# Patient Record
Sex: FEMALE | Race: ASIAN | NOT HISPANIC OR LATINO | Employment: UNEMPLOYED | URBAN - METROPOLITAN AREA
[De-identification: names, ages, dates, MRNs, and addresses within clinical notes are randomized per-mention and may not be internally consistent; named-entity substitution may affect disease eponyms.]

---

## 2019-04-08 ENCOUNTER — NON-PROVIDER VISIT (OUTPATIENT)
Dept: URGENT CARE | Facility: CLINIC | Age: 24
End: 2019-04-08

## 2019-04-08 DIAGNOSIS — Z11.1 PPD SCREENING TEST: ICD-10-CM

## 2019-04-08 PROCEDURE — 86580 TB INTRADERMAL TEST: CPT | Performed by: NURSE PRACTITIONER

## 2019-04-08 NOTE — NON-PROVIDER
Dorcas Anton is a 23 y.o. female here for a non-provider visit for PPD placement -- Step 1 of 1    Reason for PPD:  work requirement    1. TB evaluation questionnaire completed by patient? Yes      -  If any answers marked yes did you contact a provider prior to placing? Not Indicated  2.  Patient notified to return to clinic for reading on: 04/10/19 or 04/11/19  3.  PPD Placement documentation completed on TB evaluation questionnaire? Yes  4.  Location of TB evaluation questionnaire filed: [facility]

## 2019-04-10 ENCOUNTER — APPOINTMENT (OUTPATIENT)
Dept: RADIOLOGY | Facility: IMAGING CENTER | Age: 24
End: 2019-04-10
Attending: PHYSICIAN ASSISTANT

## 2019-04-10 ENCOUNTER — NON-PROVIDER VISIT (OUTPATIENT)
Dept: URGENT CARE | Facility: CLINIC | Age: 24
End: 2019-04-10

## 2019-04-10 DIAGNOSIS — R76.11 POSITIVE TB TEST: ICD-10-CM

## 2019-04-10 LAB — TB WHEAL 3D P 5 TU DIAM: NORMAL MM

## 2019-04-10 PROCEDURE — 71045 X-RAY EXAM CHEST 1 VIEW: CPT | Mod: TC | Performed by: PHYSICIAN ASSISTANT

## 2019-04-10 NOTE — PROGRESS NOTES
Dorcas Anton is a 23 y.o. female here for a non-provider visit for PPD reading -- Step 1 of 1.      1.  Resulted in Epic under enter/edit results? Yes   2.  TB evaluation questionnaire scanned into chart and original given to patient?Yes      3. Was induration greater than 0 mm? Yes, verified by Provider: sravanthi regalado .        Routed to PCP? Yes

## 2022-06-07 ENCOUNTER — OFFICE VISIT (OUTPATIENT)
Dept: URGENT CARE | Facility: PHYSICIAN GROUP | Age: 27
End: 2022-06-07
Payer: COMMERCIAL

## 2022-06-07 ENCOUNTER — HOSPITAL ENCOUNTER (OUTPATIENT)
Facility: MEDICAL CENTER | Age: 27
End: 2022-06-07
Attending: PHYSICIAN ASSISTANT
Payer: COMMERCIAL

## 2022-06-07 VITALS
SYSTOLIC BLOOD PRESSURE: 104 MMHG | DIASTOLIC BLOOD PRESSURE: 62 MMHG | RESPIRATION RATE: 18 BRPM | TEMPERATURE: 98.8 F | HEART RATE: 88 BPM | OXYGEN SATURATION: 96 % | BODY MASS INDEX: 20.25 KG/M2 | HEIGHT: 64 IN | WEIGHT: 118.6 LBS

## 2022-06-07 DIAGNOSIS — B34.9 NONSPECIFIC SYNDROME SUGGESTIVE OF VIRAL ILLNESS: ICD-10-CM

## 2022-06-07 DIAGNOSIS — U07.1 COVID-19: Primary | ICD-10-CM

## 2022-06-07 PROCEDURE — 99203 OFFICE O/P NEW LOW 30 MIN: CPT | Mod: CS | Performed by: PHYSICIAN ASSISTANT

## 2022-06-07 PROCEDURE — U0005 INFEC AGEN DETEC AMPLI PROBE: HCPCS

## 2022-06-07 PROCEDURE — U0003 INFECTIOUS AGENT DETECTION BY NUCLEIC ACID (DNA OR RNA); SEVERE ACUTE RESPIRATORY SYNDROME CORONAVIRUS 2 (SARS-COV-2) (CORONAVIRUS DISEASE [COVID-19]), AMPLIFIED PROBE TECHNIQUE, MAKING USE OF HIGH THROUGHPUT TECHNOLOGIES AS DESCRIBED BY CMS-2020-01-R: HCPCS

## 2022-06-07 RX ORDER — DEXTROMETHORPHAN HYDROBROMIDE AND PROMETHAZINE HYDROCHLORIDE 15; 6.25 MG/5ML; MG/5ML
5 SYRUP ORAL EVERY 6 HOURS PRN
Qty: 118 ML | Refills: 0 | Status: SHIPPED | OUTPATIENT
Start: 2022-06-07 | End: 2022-06-14

## 2022-06-07 RX ORDER — BENZONATATE 100 MG/1
100 CAPSULE ORAL 3 TIMES DAILY PRN
Qty: 30 CAPSULE | Refills: 0 | Status: SHIPPED | OUTPATIENT
Start: 2022-06-07 | End: 2022-06-15

## 2022-06-07 ASSESSMENT — ENCOUNTER SYMPTOMS
CONSTIPATION: 0
SHORTNESS OF BREATH: 1
CHILLS: 0
EYE PAIN: 0
DIARRHEA: 0
NAUSEA: 0
VOMITING: 0
SORE THROAT: 1
COUGH: 1
MYALGIAS: 1
HEADACHES: 0
ABDOMINAL PAIN: 0
FEVER: 1

## 2022-06-07 NOTE — LETTER
June 7, 2022    To Whom It May Concern:         This is confirmation that Dorcas Anton attended her scheduled appointment with Mike Torres P.A.-C. on 6/07/22.  Your employee was seen in our clinic today and had a recent positive antigen test. We are performing a PCR test for confirmation at this point however I think it's reasonable to consider her as POSITIVE for covid-19.  Given her workplace I highly recommend she remain out of work until significantly improved.    Since the results of testing are positive, your employee should follow the CDC guidelines.  These are isolation for a minimum of 5 days and at least 24 hours have passed since your last fever without the use of fever-reducing medications and all other symptoms have improved.  After completing the isolation the patient must continue to use a well fitting mask for an additional 5 days.  The health department may contact you and provide further directions regarding self-isolation and return to work.     This is the only note that will be provided from [facility] for this visit.  Your employee will require an appointment with a primary care provider if FMLA or disability forms are required.  If you have any questions please do not hesitate to call me at the phone number listed below.    Sincerely,    Mike Torres P.A.-C.  [phone]

## 2022-06-08 DIAGNOSIS — B34.9 NONSPECIFIC SYNDROME SUGGESTIVE OF VIRAL ILLNESS: ICD-10-CM

## 2022-06-08 LAB
COVID ORDER STATUS COVID19: NORMAL
SARS-COV-2 RNA RESP QL NAA+PROBE: DETECTED
SPECIMEN SOURCE: ABNORMAL

## 2022-06-08 NOTE — PROGRESS NOTES
"Subjective:   Dorcas Anton is a 26 y.o. female who presents for Cough (Sob,x5 days,req covid test.)      26-year-old female reports 5 days of symptoms, tested positive via antigen for COVID on day 2 of symptoms with ongoing cough, congestion, generalized fatigue.  Started feeling more discomfort today in her chest as well as more fatigue, felt like she was more breathless when exerting herself and doing ADLs.  No current active chest pain.  Chest pain is limited to coughing episodes.  She notes that the discomfort today follows lots of coughing yesterday.  No history of asthma or cardiopulmonary disease.      Review of Systems   Constitutional: Positive for fever and malaise/fatigue. Negative for chills.   HENT: Positive for congestion and sore throat. Negative for ear pain.    Eyes: Negative for pain.   Respiratory: Positive for cough and shortness of breath.    Cardiovascular: Negative for chest pain.   Gastrointestinal: Negative for abdominal pain, constipation, diarrhea, nausea and vomiting.   Genitourinary: Negative for dysuria.   Musculoskeletal: Positive for myalgias.   Skin: Negative for rash.   Neurological: Negative for headaches.       Medications, Allergies, and current problem list reviewed today in Epic.     Objective:     /62 (BP Location: Left arm, Patient Position: Sitting, BP Cuff Size: Adult)   Pulse 88   Temp 37.1 °C (98.8 °F) (Temporal)   Resp 18   Ht 1.626 m (5' 4\")   Wt 53.8 kg (118 lb 9.6 oz)   SpO2 96%     Physical Exam  Vitals reviewed.   Constitutional:       Appearance: Normal appearance.   HENT:      Head: Normocephalic and atraumatic.      Right Ear: Tympanic membrane, ear canal and external ear normal.      Left Ear: Ear canal and external ear normal.      Nose: Nose normal.      Mouth/Throat:      Mouth: Mucous membranes are moist.   Eyes:      Conjunctiva/sclera: Conjunctivae normal.   Cardiovascular:      Rate and Rhythm: Normal rate and regular rhythm.   Pulmonary:    "   Effort: Pulmonary effort is normal.      Breath sounds: Normal breath sounds.   Skin:     General: Skin is warm and dry.      Capillary Refill: Capillary refill takes less than 2 seconds.   Neurological:      Mental Status: She is alert and oriented to person, place, and time.         Assessment/Plan:     Diagnosis and associated orders:     1. COVID-19  promethazine-dextromethorphan (PROMETHAZINE-DM) 6.25-15 MG/5ML syrup    benzonatate (TESSALON) 100 MG Cap   2. Nonspecific syndrome suggestive of viral illness  COVID/SARS CoV-2 PCR      Comments/MDM:     • PERC score 0, low suspicion for PE at this point however we will perform COVID testing as a precaution, recommend closely monitor if chest pain or shortness of breath is worsening recommend repeat evaluation.  Cough medicine sent primacy, warned about sedating nature of this medication.  Continue supportive care.  Remain out of work until symptomatically improved.  If worsening respiratory status present to the ER.  Currently no hypoxia, low suspicion for severe COVID at this point         Differential diagnosis, natural history, supportive care, and indications for immediate follow-up discussed.    Advised the patient to follow-up with the primary care physician for recheck, reevaluation, and consideration of further management.    Please note that this dictation was created using voice recognition software. I have made a reasonable attempt to correct obvious errors, but I expect that there are errors of grammar and possibly content that I did not discover before finalizing the note.    This note was electronically signed by Mike Trores PA-C

## 2022-06-15 ENCOUNTER — OFFICE VISIT (OUTPATIENT)
Dept: URGENT CARE | Facility: PHYSICIAN GROUP | Age: 27
End: 2022-06-15
Payer: COMMERCIAL

## 2022-06-15 VITALS
RESPIRATION RATE: 20 BRPM | HEIGHT: 64 IN | DIASTOLIC BLOOD PRESSURE: 64 MMHG | OXYGEN SATURATION: 99 % | WEIGHT: 118 LBS | SYSTOLIC BLOOD PRESSURE: 110 MMHG | BODY MASS INDEX: 20.14 KG/M2 | TEMPERATURE: 99.7 F | HEART RATE: 92 BPM

## 2022-06-15 DIAGNOSIS — Z02.9 ADMINISTRATIVE ENCOUNTER: ICD-10-CM

## 2022-06-15 DIAGNOSIS — Z86.16 HISTORY OF 2019 NOVEL CORONAVIRUS DISEASE (COVID-19): ICD-10-CM

## 2022-06-15 PROCEDURE — 99213 OFFICE O/P EST LOW 20 MIN: CPT | Performed by: NURSE PRACTITIONER

## 2022-06-15 NOTE — LETTER
Caery 15, 2022       Patient: Dorcas Anton   YOB: 1995   Date of Visit: 6/15/2022         To Whom It May Concern:    In my medical opinion, I recommend that Dorcas Anton return to full duty, no restrictions on 06/16/22              Sincerely,          LATHA RodriguezPMarthaN.  Electronically Signed

## 2022-06-15 NOTE — PROGRESS NOTES
"Dorcas Anton is a 26 y.o. female who presents for Other (Pt is here to get a work note. )      GUSTAVO Toscano is a 26-year-old female presents with request to have a note to go back to work.  She tested positive for COVID infection on June 6.  She has been out of work since that time.  She tried to go back to work but was told she was still on administrative leave until she got a note saying that she could return to work without any restrictions.  She feels healthy and well now.  She has no residual symptoms of her COVID infection.  She denies fever, cough, body aches for greater than a week.  No other aggravating alleviating factors.    ROS    Allergies:     No Known Allergies    PMSFS Hx:  History reviewed. No pertinent past medical history.  History reviewed. No pertinent surgical history.  History reviewed. No pertinent family history.  Social History     Tobacco Use   • Smoking status: Current Every Day Smoker     Types: Cigarettes   • Smokeless tobacco: Never Used   Substance Use Topics   • Alcohol use: Not Currently       Problems:   There is no problem list on file for this patient.      Medications:   No current outpatient medications on file prior to visit.     No current facility-administered medications on file prior to visit.          Objective:     /64 (BP Location: Left arm, Patient Position: Sitting, BP Cuff Size: Adult)   Pulse 92   Temp 37.6 °C (99.7 °F) (Temporal)   Resp 20   Ht 1.626 m (5' 4\")   Wt 53.5 kg (118 lb)   SpO2 99%   BMI 20.25 kg/m²     Physical Exam  Vitals reviewed.   Constitutional:       Appearance: Normal appearance. She is normal weight.   Cardiovascular:      Rate and Rhythm: Normal rate.      Pulses: Normal pulses.      Heart sounds: Normal heart sounds.   Pulmonary:      Effort: Pulmonary effort is normal.      Breath sounds: Normal breath sounds.   Skin:     General: Skin is warm.      Capillary Refill: Capillary refill takes less than 2 seconds.   Neurological: "      Mental Status: She is alert and oriented to person, place, and time.   Psychiatric:         Mood and Affect: Mood normal.         Behavior: Behavior normal.         Assessment /Associated Orders:      1. History of 2019 novel coronavirus disease (COVID-19)     2. Administrative encounter         Medical Decision Making:    Her COVID infection/symptoms are completely resolved.  She can be returned to work without any restrictions.  A note was supplied to her today.  Positive COVID test on 6/7/2022  She is completed the CDC requirement for quarantine.    Time spent evaluating this patient was at least 21minutes and includes preparing for visit, counseling/education, exam and evaluation, obtaining history, independent interpretation, ordering lab/test/procedures,medication management and documentation.Time does not include separately billable procedures noted .

## 2024-01-06 ENCOUNTER — OFFICE VISIT (OUTPATIENT)
Dept: URGENT CARE | Facility: PHYSICIAN GROUP | Age: 29
End: 2024-01-06
Payer: COMMERCIAL

## 2024-01-06 VITALS
RESPIRATION RATE: 14 BRPM | HEIGHT: 64 IN | BODY MASS INDEX: 20.14 KG/M2 | SYSTOLIC BLOOD PRESSURE: 96 MMHG | DIASTOLIC BLOOD PRESSURE: 60 MMHG | HEART RATE: 93 BPM | TEMPERATURE: 99.3 F | OXYGEN SATURATION: 97 % | WEIGHT: 118 LBS

## 2024-01-06 DIAGNOSIS — B34.9 VIRAL ILLNESS: ICD-10-CM

## 2024-01-06 DIAGNOSIS — R05.1 ACUTE COUGH: ICD-10-CM

## 2024-01-06 PROCEDURE — 3078F DIAST BP <80 MM HG: CPT

## 2024-01-06 PROCEDURE — 3074F SYST BP LT 130 MM HG: CPT

## 2024-01-06 PROCEDURE — 99213 OFFICE O/P EST LOW 20 MIN: CPT

## 2024-01-06 RX ORDER — BENZONATATE 100 MG/1
100 CAPSULE ORAL 3 TIMES DAILY PRN
Qty: 30 CAPSULE | Refills: 0 | Status: SHIPPED | OUTPATIENT
Start: 2024-01-06

## 2024-01-06 ASSESSMENT — ENCOUNTER SYMPTOMS
RHINORRHEA: 1
VOMITING: 0
MYALGIAS: 1
CHILLS: 0
FEVER: 1
SORE THROAT: 0
COUGH: 1
DIARRHEA: 1

## 2024-01-06 NOTE — LETTER
[facility] [place]  [address]  [place] [id number]     January 6, 2024    Patient: Dorcas Anton   YOB: 1995   Date of Visit: 1/6/2024       To Whom It May Concern:    Dorcas Anton was seen and treated in our department on 1/6/2024. Please excuse absences for medical reasons. May return to work 1/10/2024 as long as fever free for at least 24 hours.     Sincerely,     Electronically Signed by EVELIA Mcdaniel

## 2024-01-06 NOTE — PROGRESS NOTES
"Subjective     Dorcas Anton is a 28 y.o. female who presents with Cough (X5 days: Lost voice, coughing, feverish on/off. )            Cough  This is a new problem. The current episode started in the past 7 days. The cough is Productive of purulent sputum. Associated symptoms include a fever, myalgias and rhinorrhea. Pertinent negatives include no chills or sore throat. She has tried OTC cough suppressant (nyquil) for the symptoms. The treatment provided mild relief.     Patient presents with symptoms for 4 to 5 days now.  He endorses fever and chills, with temperature highest at 102 °F, and last fever this morning.  She also further endorses fatigue, rhinorrhea, nasal congestion, productive cough, hoarseness, diarrhea, and myalgias.  She has had 2 episodes of diarrhea daily.  She takes acetaminophen, ibuprofen, NyQuil, providing some relief.  She has had a sick coworker.  She reports being COVID vaccinated    Patient's current problem list, medications, and past medical/surgical history were reviewed in Epic.    PMH:  has no past medical history on file.  MEDS: No current outpatient medications on file.  ALLERGIES: No Known Allergies  SURGHX: No past surgical history on file.  SOCHX:  reports that she has quit smoking. Her smoking use included cigarettes. She has never used smokeless tobacco. She reports that she does not currently use alcohol. She reports that she does not use drugs.  FH: Reviewed with patient, not pertinent to this visit.       Review of Systems   Constitutional:  Positive for fever and malaise/fatigue. Negative for chills.   HENT:  Positive for congestion and rhinorrhea. Negative for sore throat.    Respiratory:  Positive for cough.    Gastrointestinal:  Positive for diarrhea. Negative for vomiting.   Musculoskeletal:  Positive for myalgias.   All other systems reviewed and are negative.             Objective     BP 96/60   Pulse 93   Temp 37.4 °C (99.3 °F)   Resp 14   Ht 1.626 m (5' 4\")   " Wt 53.5 kg (118 lb)   SpO2 97%   BMI 20.25 kg/m²      Physical Exam  Constitutional:       Appearance: Normal appearance.   HENT:      Head: Normocephalic.      Nose: Congestion and rhinorrhea present.      Mouth/Throat:      Pharynx: Posterior oropharyngeal erythema present.   Eyes:      Extraocular Movements: Extraocular movements intact.   Cardiovascular:      Rate and Rhythm: Normal rate and regular rhythm.      Pulses: Normal pulses.      Heart sounds: Normal heart sounds.   Pulmonary:      Effort: Pulmonary effort is normal.      Breath sounds: Normal breath sounds.   Musculoskeletal:         General: Normal range of motion.      Cervical back: Normal range of motion.   Skin:     General: Skin is warm and dry.   Neurological:      General: No focal deficit present.      Mental Status: She is alert.   Psychiatric:         Mood and Affect: Mood normal.         Behavior: Behavior normal.         Judgment: Judgment normal.                Assessment & Plan        1. Viral illness      2. Acute cough    - benzonatate (TESSALON) 100 MG Cap; Take 1 Capsule by mouth 3 times a day as needed for Cough.  Dispense: 30 Capsule; Refill: 0       Patient declined viral testing today.  Her presentation is consistent with a viral illness.  Advised on symptomatic treatment at home.  May take Tessalon Perles 3 times daily as needed for cough.  Recommended oral antihistamine and Flonase nasal spray daily for rhinorrhea and nasal congestion.  Instructed to return to clinic with worsening or persistent symptoms. Discussed treatment plan with patient, she is agreeable and verbalized understanding.  Educated patient on signs and symptoms watch out for, when to return to the clinic or go to the ER.    Recommended supportive treatment at home:  OTC Tylenol or Motrin for fever/discomfort.  OTC supportive care for nasal congestion - saline nasal spray/Flonase nasal spray and/or netipot  Humidifier and steam inhalation/warm  showers.  Increase oral fluid intake.  Warm saline gargles, cool liquids, or throat sprays for sore throat    Work note given.    Electronically Signed by EVELIA Mcdaniel